# Patient Record
Sex: MALE | Race: WHITE | NOT HISPANIC OR LATINO | Employment: OTHER | URBAN - METROPOLITAN AREA
[De-identification: names, ages, dates, MRNs, and addresses within clinical notes are randomized per-mention and may not be internally consistent; named-entity substitution may affect disease eponyms.]

---

## 2017-11-18 ENCOUNTER — OFFICE VISIT (OUTPATIENT)
Dept: URGENT CARE | Facility: CLINIC | Age: 67
End: 2017-11-18

## 2017-11-18 ENCOUNTER — HOSPITAL ENCOUNTER (EMERGENCY)
Facility: HOSPITAL | Age: 67
Discharge: LEFT BEFORE DISCHARGE | End: 2017-11-18
Attending: EMERGENCY MEDICINE

## 2017-11-18 VITALS
RESPIRATION RATE: 18 BRPM | HEART RATE: 73 BPM | WEIGHT: 200 LBS | BODY MASS INDEX: 28 KG/M2 | HEIGHT: 71 IN | OXYGEN SATURATION: 96 % | TEMPERATURE: 98 F | DIASTOLIC BLOOD PRESSURE: 94 MMHG | SYSTOLIC BLOOD PRESSURE: 155 MMHG

## 2017-11-18 VITALS
TEMPERATURE: 98 F | HEART RATE: 78 BPM | WEIGHT: 200 LBS | HEIGHT: 70 IN | SYSTOLIC BLOOD PRESSURE: 171 MMHG | BODY MASS INDEX: 28.63 KG/M2 | RESPIRATION RATE: 18 BRPM | OXYGEN SATURATION: 98 % | DIASTOLIC BLOOD PRESSURE: 89 MMHG

## 2017-11-18 DIAGNOSIS — M79.662 PAIN AND SWELLING OF LEFT LOWER LEG: Primary | ICD-10-CM

## 2017-11-18 DIAGNOSIS — M79.89 PAIN AND SWELLING OF LEFT LOWER LEG: Primary | ICD-10-CM

## 2017-11-18 DIAGNOSIS — M79.89 LEFT LEG SWELLING: Primary | ICD-10-CM

## 2017-11-18 PROCEDURE — 99203 OFFICE O/P NEW LOW 30 MIN: CPT | Mod: S$GLB,,, | Performed by: PHYSICIAN ASSISTANT

## 2017-11-18 PROCEDURE — 99283 EMERGENCY DEPT VISIT LOW MDM: CPT

## 2017-11-18 PROCEDURE — 99284 EMERGENCY DEPT VISIT MOD MDM: CPT | Mod: ,,, | Performed by: EMERGENCY MEDICINE

## 2017-11-18 RX ORDER — AMLODIPINE AND VALSARTAN 5; 160 MG/1; MG/1
1 TABLET ORAL DAILY
COMMUNITY

## 2017-11-18 RX ORDER — DICLOFENAC SODIUM 75 MG/1
100 TABLET, DELAYED RELEASE ORAL 2 TIMES DAILY
COMMUNITY

## 2017-11-18 NOTE — ED PROVIDER NOTES
Encounter Date: 11/18/2017    SCRIBE #1 NOTE: I, Marian Renteria, am scribing for, and in the presence of,  Dr. Thompson. I have scribed the following portions of the note - the Resident attestation.       History     Chief Complaint   Patient presents with    Left leg pain     Lamine Lilly is a 67 y.o. Male presents with LLE edema x 2 weeks that has slowly increased without any trauma. It has some pain but is not severe. He again denies any trauma to the area, erythema, fever or chills. He has no previous hx of DVT and is not on any blood thinners.       The history is provided by the patient and medical records.     Review of patient's allergies indicates:   Allergen Reactions    Atropine Nausea Only     Past Medical History:   Diagnosis Date    Hypertension      Past Surgical History:   Procedure Laterality Date    BACK SURGERY       No family history on file.  Social History   Substance Use Topics    Smoking status: Never Smoker    Smokeless tobacco: Not on file    Alcohol use Yes     Review of Systems   Constitutional: Negative for activity change, appetite change, chills and fever.   Eyes: Negative for photophobia and visual disturbance.   Respiratory: Negative for cough, shortness of breath and wheezing.    Cardiovascular: Positive for leg swelling. Negative for chest pain.   Gastrointestinal: Negative for diarrhea and nausea.   Musculoskeletal: Positive for gait problem. Negative for joint swelling.   Neurological: Negative for weakness and numbness.   Psychiatric/Behavioral: Negative for agitation, behavioral problems and confusion.       Physical Exam     Initial Vitals [11/18/17 1015]   BP Pulse Resp Temp SpO2   (!) 171/89 78 18 98.4 °F (36.9 °C) 98 %      MAP       116.33         Physical Exam    Nursing note and vitals reviewed.  Constitutional: He appears well-developed and well-nourished. He is not diaphoretic.   HENT:   Head: Normocephalic and atraumatic.   Eyes: Conjunctivae and EOM are  normal. Pupils are equal, round, and reactive to light.   Neck: Normal range of motion. Neck supple.   Cardiovascular: Normal rate, regular rhythm and normal heart sounds.   Pulmonary/Chest: Breath sounds normal. No respiratory distress. He has no rhonchi.   Musculoskeletal: Normal range of motion. He exhibits edema (LLE edema noted with mild pain in the calf with palpation. + homans).   Neurological: He is alert and oriented to person, place, and time. He has normal strength and normal reflexes.   Psychiatric: He has a normal mood and affect. Thought content normal.         ED Course   Procedures  Labs Reviewed - No data to display          Medical Decision Making:   History:   Old Medical Records: I decided to obtain old medical records.  Initial Assessment:   1033: pt seen and examined. Discussed with staff. US ordered to rule out DVT LLE.   1232: pt left no longer wanting trx  Clinical Tests:   Radiological Study: Ordered and Reviewed  ED Management:  1033: pt seen and examined. Discussed with staff. US ordered to rule out DVT LLE.   1232: pt left no longer wanting trx  Other:   I discussed test(s) with the performing physician.       APC / Resident Notes:   1033: pt seen and examined. Discussed with staff. US ordered to rule out DVT LLE.   1232: pt left no longer wanting trx       Scribe Attestation:   Scribe #1: I performed the above scribed service and the documentation accurately describes the services I performed. I attest to the accuracy of the note.    Attending Attestation:   Physician Attestation Statement for Resident:  As the supervising MD  I agree with the above history. -: 67 year old male presents for evaluation of left lower extremity swelling.    As the supervising MD I agree with the above PE.    As the supervising MD I agree with the above treatment, course, plan, and disposition.  I have reviewed the following: records from a referring facility.                    ED Course      Clinical  Impression:   The encounter diagnosis was Left leg swelling.    Disposition:   Disposition: Eloped   pt left no longer wanting trx                        Claus Nichols MD  Resident  11/20/17 1110       Maynor Thompson MD  11/24/17 1560

## 2017-11-18 NOTE — PROGRESS NOTES
"Subjective:       Patient ID: Lamine Lilly is a 67 y.o. male.    Vitals:  height is 5' 10.87" (1.8 m) and weight is 90.7 kg (200 lb). His tympanic temperature is 98.1 °F (36.7 °C). His blood pressure is 155/94 (abnormal) and his pulse is 73. His respiration is 18 and oxygen saturation is 96%.     Chief Complaint: Rash (Left lower leg)    Rash   This is a new problem. The current episode started 1 to 4 weeks ago. The problem has been gradually worsening since onset. The affected locations include the left lower leg. The rash is characterized by redness. He was exposed to nothing. Associated symptoms include joint pain. Pertinent negatives include no fever, shortness of breath or sore throat.     Review of Systems   Constitution: Negative for chills and fever.   HENT: Negative for sore throat.    Cardiovascular: Negative for chest pain.   Respiratory: Negative for shortness of breath.    Skin: Positive for color change. Negative for itching.   Musculoskeletal: Positive for joint pain and joint swelling.       Objective:      Physical Exam   Constitutional: He is oriented to person, place, and time. He appears well-developed and well-nourished. No distress.   HENT:   Head: Normocephalic and atraumatic.   Eyes: Conjunctivae are normal.   Neck: Normal range of motion. Neck supple.   Cardiovascular: Normal rate and regular rhythm.  Exam reveals no gallop and no friction rub.    No murmur heard.  Pulmonary/Chest: Effort normal and breath sounds normal. He has no wheezes. He has no rales.   Musculoskeletal: Normal range of motion.        Left lower leg: He exhibits tenderness, swelling and edema.   Neurological: He is alert and oriented to person, place, and time.   Skin: Skin is warm and dry. No rash noted. There is erythema (minimal to the left lower leg).   Psychiatric: He has a normal mood and affect. His behavior is normal. Judgment and thought content normal.   Nursing note and vitals reviewed.      9:42 AM - Spoke " with charge nurse at [facility] ED about this patient.  They are expecting him and he needs an ultrasound of his left leg.    Assessment:       1. Pain and swelling of left lower leg        Plan:         Pain and swelling of left lower leg      Lamine was seen today for rash.    Diagnoses and all orders for this visit:    Pain and swelling of left lower leg      Patient Instructions   - Based on your exam today I fell you need further evaluation immediately.  You should go to the ER of your choice for further evaluation and treatment.   Understanding Deep Vein Thrombosis  Deep vein thrombosis (DVT) is a condition in which a blood clot or thrombus forms in a deep vein. A blood clot is most common in the leg, but can develop in a large vein deep inside the leg, arm, or other part of the body. Part of the clot called an embolus can separate from the vein. It may travel to the lungs and form a pulmonary embolus (PE). This can cut off the flow to a portion of the lung or to the entire lung. A PE is a medical emergency and may cause death. Healthcare providers use the term venous thromboembolism (VTE) to describe the two conditions, DVT and PE. They use the term VTE because the two conditions are very closely related. And, because their prevention and treatment are closely related.  Over time, a blood clot can also permanently damage veins. To protect your health, a blood clot must be treated right away.  How DVT develops  The deep veins of the legs are located in the muscles. These help carry blood clot from the legs to the heart. When leg muscles contract and relax, blood is squeezed through the veins back to the heart. One-way valves inside the veins help keep the blood moving in the right direction. When blood moves too slowly or not at all, it can pool in the veins. This makes a clot more likely to form.    Risk factors  Anyone can develop a blood clot. The following risk factors make a blood clot more likely  to happen:  · Being inactive for a long period, such as when youre in the hospital, or traveling by plane or car  · Injury to a vein from an accident, a broken bone, or surgery  · Having blood clots in the past  · Personal or family history of a blood-clotting disorder  · Recent surgery  · Cancer and certain cancer treatments  · Smoking  Other factors can also put you at higher risk for a blood clot. They include:  · Age over 60 years  · Pregnancy  · Taking birth control or hormone replacement  · Having other vein problems  · Being overweight  Common symptoms  A blood clot does not always cause obvious symptoms. If you do have symptoms, they usually happen suddenly. They may include:  · Pain, especially deep in the muscle  · Swelling  · Aching or tenderness  · Red or warm skin  · Fever  Call your healthcare provider if you have these symptoms.  Symptoms of pulmonary embolism may include:  · Trouble breathing  · Fast heartbeat  · Chest pain  · Sweating  · Coughing (may cough up blood)  · Fainting  Call 911 if you have these symptoms.   If you take medicine to help prevent blood clots, you have an increased risk of bleeding. Call 911 if you have heavy or uncontrolled bleeding. Call your healthcare provider if you have other signs or symptoms of bleeding like blood in the urine, bleeding with bowel movements, or bleeding from the nose, gums, a cut, or vagina.  Diagnosing DVT  Diagnosis begins with thorough questions about your symptoms and medical history along with a physical exam.  Diagnostic tests include:  · An imaging test called a duplex ultrasound. This test uses sound waves to create pictures of veins and blood flow.  · Blood tests to check for clotting and other problems.  If your healthcare provider thinks you may have pulmonary embolism, additional testing will be done.  Treating DVT  Treating a blood clot may include:  · Medicine to thin the blood and prevent pulmonary embolism and other  complications.  · Staying in the hospital. This may or may not be necessary.  · Surgery for some people, like those who cannot take blood-thinning medicines.  Preventing DVT  Many people who are in the hospital are at an increased risk of developing blood clots. So, preventing blood clots is an important part of in-hospital care. The care may include getting out of bed regularly, taking medicine, or using special therapies or devices. Other factors and conditions may increase the risk of blood clots. Review your risk with your healthcare provider.  Date Last Reviewed: 5/1/2016 © 2000-2017 [employer]. [address] [place] [address], [place] [address]. All rights reserved. This information is not intended as a substitute for professional medical care. Always follow your healthcare professional's instructions.

## 2017-11-18 NOTE — PATIENT INSTRUCTIONS
- Based on your exam today I fell you need further evaluation immediately.  You should go to the ER of your choice for further evaluation and treatment.   Understanding Deep Vein Thrombosis  Deep vein thrombosis (DVT) is a condition in which a blood clot or thrombus forms in a deep vein. A blood clot is most common in the leg, but can develop in a large vein deep inside the leg, arm, or other part of the body. Part of the clot called an embolus can separate from the vein. It may travel to the lungs and form a pulmonary embolus (PE). This can cut off the flow to a portion of the lung or to the entire lung. A PE is a medical emergency and may cause death. Healthcare providers use the term venous thromboembolism (VTE) to describe the two conditions, DVT and PE. They use the term VTE because the two conditions are very closely related. And, because their prevention and treatment are closely related.  Over time, a blood clot can also permanently damage veins. To protect your health, a blood clot must be treated right away.  How DVT develops  The deep veins of the legs are located in the muscles. These help carry blood clot from the legs to the heart. When leg muscles contract and relax, blood is squeezed through the veins back to the heart. One-way valves inside the veins help keep the blood moving in the right direction. When blood moves too slowly or not at all, it can pool in the veins. This makes a clot more likely to form.    Risk factors  Anyone can develop a blood clot. The following risk factors make a blood clot more likely to happen:  · Being inactive for a long period, such as when youre in the hospital, or traveling by plane or car  · Injury to a vein from an accident, a broken bone, or surgery  · Having blood clots in the past  · Personal or family history of a blood-clotting disorder  · Recent surgery  · Cancer and certain cancer treatments  · Smoking  Other factors can also put you at higher risk for a  blood clot. They include:  · Age over 60 years  · Pregnancy  · Taking birth control or hormone replacement  · Having other vein problems  · Being overweight  Common symptoms  A blood clot does not always cause obvious symptoms. If you do have symptoms, they usually happen suddenly. They may include:  · Pain, especially deep in the muscle  · Swelling  · Aching or tenderness  · Red or warm skin  · Fever  Call your healthcare provider if you have these symptoms.  Symptoms of pulmonary embolism may include:  · Trouble breathing  · Fast heartbeat  · Chest pain  · Sweating  · Coughing (may cough up blood)  · Fainting  Call 911 if you have these symptoms.   If you take medicine to help prevent blood clots, you have an increased risk of bleeding. Call 911 if you have heavy or uncontrolled bleeding. Call your healthcare provider if you have other signs or symptoms of bleeding like blood in the urine, bleeding with bowel movements, or bleeding from the nose, gums, a cut, or vagina.  Diagnosing DVT  Diagnosis begins with thorough questions about your symptoms and medical history along with a physical exam.  Diagnostic tests include:  · An imaging test called a duplex ultrasound. This test uses sound waves to create pictures of veins and blood flow.  · Blood tests to check for clotting and other problems.  If your healthcare provider thinks you may have pulmonary embolism, additional testing will be done.  Treating DVT  Treating a blood clot may include:  · Medicine to thin the blood and prevent pulmonary embolism and other complications.  · Staying in the hospital. This may or may not be necessary.  · Surgery for some people, like those who cannot take blood-thinning medicines.  Preventing DVT  Many people who are in the hospital are at an increased risk of developing blood clots. So, preventing blood clots is an important part of in-hospital care. The care may include getting out of bed regularly, taking medicine, or using  special therapies or devices. Other factors and conditions may increase the risk of blood clots. Review your risk with your healthcare provider.  Date Last Reviewed: 5/1/2016  © 1060-2523 [employer]. [address] [place] [address], [place] [address]. All rights reserved. This information is not intended as a substitute for professional medical care. Always follow your healthcare professional's instructions.

## 2017-11-18 NOTE — ED TRIAGE NOTES
Pt arrived to ED with CC of left leg swelling x2 weeks, reports was seen at urgent care and told to come to ED. Denies pain numbness or tingling. Denies any other complaints.